# Patient Record
Sex: FEMALE | Race: BLACK OR AFRICAN AMERICAN | Employment: UNEMPLOYED | URBAN - METROPOLITAN AREA
[De-identification: names, ages, dates, MRNs, and addresses within clinical notes are randomized per-mention and may not be internally consistent; named-entity substitution may affect disease eponyms.]

---

## 2017-01-20 ENCOUNTER — HOSPITAL ENCOUNTER (OUTPATIENT)
Dept: GENERAL RADIOLOGY | Age: 62
Discharge: HOME | End: 2017-01-20
Attending: PHYSICIAN ASSISTANT | Admitting: PHYSICIAN ASSISTANT

## 2017-01-20 LAB
ALBUMIN SERPL-MCNC: 4.3 GM/DL (ref 3.4–5)
ALP BLD-CCNC: 87 IU/L (ref 40–129)
ALT SERPL-CCNC: 12 U/L (ref 10–40)
AST SERPL-CCNC: 20 IU/L (ref 15–37)
BASOPHILS ABSOLUTE: 0 K/CU MM
BASOPHILS RELATIVE PERCENT: 0.5 % (ref 0–1)
BILIRUB SERPL-MCNC: 0.3 MG/DL (ref 0–1)
BILIRUBIN DIRECT: 0.2 MG/DL (ref 0–0.3)
BILIRUBIN, INDIRECT: 0.1 MG/DL (ref 0–0.7)
BUN BLDV-MCNC: 14 MG/DL (ref 6–23)
CREAT SERPL-MCNC: 0.9 MG/DL (ref 0.6–1.1)
DIFFERENTIAL TYPE: ABNORMAL
EOSINOPHILS ABSOLUTE: 0.1 K/CU MM
EOSINOPHILS RELATIVE PERCENT: 1.4 % (ref 0–3)
GFR AFRICAN AMERICAN: >60 ML/MIN/1.73M2
GFR NON-AFRICAN AMERICAN: >60 ML/MIN/1.73M2
HCT VFR BLD CALC: 48 % (ref 37–47)
HEMOGLOBIN: 15.2 GM/DL (ref 12.5–16)
HEPATITIS B SURFACE ANTIGEN: NON REACTIVE
HEPATITIS C ANTIBODY: NON REACTIVE
IMMATURE NEUTROPHIL %: 0.1 % (ref 0–0.43)
LYMPHOCYTES ABSOLUTE: 2.6 K/CU MM
LYMPHOCYTES RELATIVE PERCENT: 33.3 % (ref 24–44)
MCH RBC QN AUTO: 28.7 PG (ref 27–31)
MCHC RBC AUTO-ENTMCNC: 31.7 % (ref 32–36)
MCV RBC AUTO: 90.6 FL (ref 78–100)
MONOCYTES ABSOLUTE: 0.7 K/CU MM
MONOCYTES RELATIVE PERCENT: 8.7 % (ref 0–4)
NUCLEATED RBC %: 0 %
PDW BLD-RTO: 14.7 % (ref 11.7–14.9)
PLATELET # BLD: 371 K/CU MM (ref 140–440)
PMV BLD AUTO: 10.5 FL (ref 7.5–11.1)
RBC # BLD: 5.3 M/CU MM (ref 4.2–5.4)
SEGMENTED NEUTROPHILS ABSOLUTE COUNT: 4.3 K/CU MM
SEGMENTED NEUTROPHILS RELATIVE PERCENT: 56 % (ref 36–66)
TOTAL IMMATURE NEUTOROPHIL: 0.01 K/CU MM
TOTAL NUCLEATED RBC: 0 K/CU MM
TOTAL PROTEIN: 6.7 GM/DL (ref 6.4–8.2)
WBC # BLD: 7.7 K/CU MM (ref 4–10.5)

## 2017-01-22 LAB — HEPATITIS B CORE TOTAL ANTIBODY: NEGATIVE

## 2019-06-04 ENCOUNTER — ANESTHESIA EVENT (OUTPATIENT)
Dept: OPERATING ROOM | Age: 64
End: 2019-06-04
Payer: MEDICARE

## 2019-06-04 ASSESSMENT — LIFESTYLE VARIABLES: SMOKING_STATUS: 1

## 2019-06-04 NOTE — ANESTHESIA PRE PROCEDURE
Department of Anesthesiology  Preprocedure Note       Name:  Adenike Adam   Age:  70 y.o.  :  1955                                          MRN:  [id number]         Date:  2019      Surgeon: Chandrakant Yan):  Lily Subramanian MD    Procedure: COLORECTAL CANCER SCREENING, NOT HIGH RISK (N/A )    Medications prior to admission:   Prior to Admission medications    Medication Sig Start Date End Date Taking? Authorizing Provider   butalbital-APAP-caffeine (FIORICET) -40 MG CAPS per capsule Take 1 capsule by mouth every 6 hours as needed for Headaches 18   Anastacio Beebe MD   clopidogrel (PLAVIX) 75 MG tablet Take 1 tablet by mouth daily. 12   Yash Donovan MD   hydrochlorothiazide (HYDRODIURIL) 12.5 MG tablet Take 1 tablet by mouth daily. 12   Yash Donovan MD       Current medications:    No current facility-administered medications for this encounter. Current Outpatient Medications   Medication Sig Dispense Refill    butalbital-APAP-caffeine (FIORICET) -40 MG CAPS per capsule Take 1 capsule by mouth every 6 hours as needed for Headaches 84 capsule 0    clopidogrel (PLAVIX) 75 MG tablet Take 1 tablet by mouth daily. 30 tablet 0    hydrochlorothiazide (HYDRODIURIL) 12.5 MG tablet Take 1 tablet by mouth daily. 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 Social History:    Social History     Tobacco Use    Smoking status: Current Every Day Smoker     Packs/day: 0.25     Types: Cigarettes    Smokeless tobacco: Never Used   Substance Use Topics    Alcohol use:  No                                Ready to quit: Not Answered  Counseling given: Not Answered      Vital Signs (Current): There were no vitals filed for this visit. BP Readings from Last 3 Encounters:   05/27/18 (!) 126/111       NPO Status:                                                                                 BMI:   Wt Readings from Last 3 Encounters:   05/27/18 145 lb (65.8 kg)   09/15/16 145 lb (65.8 kg)     There is no height or weight on file to calculate BMI.    CBC:   Lab Results   Component Value Date    WBC 9.5 05/27/2018    RBC 4.86 05/27/2018    HGB 14.0 05/27/2018    HCT 42.7 05/27/2018    MCV 87.9 05/27/2018    RDW 13.5 05/27/2018     05/27/2018       CMP:   Lab Results   Component Value Date     05/27/2018    K 3.7 05/27/2018     05/27/2018    CO2 25 05/27/2018    BUN 11 05/27/2018    CREATININE 0.8 05/27/2018    GFRAA >60 05/27/2018    LABGLOM >60 05/27/2018    GLUCOSE 117 05/27/2018    PROT 6.2 05/27/2018    PROT 6.4 08/04/2012    CALCIUM 9.3 05/27/2018    BILITOT 0.3 05/27/2018    ALKPHOS 86 05/27/2018    AST 13 05/27/2018    ALT 8 05/27/2018       POC Tests: No results for input(s): POCGLU, POCNA, POCK, POCCL, POCBUN, POCHEMO, POCHCT in the last 72 hours. Coags:   Lab Results   Component Value Date    PROTIME 11.2 05/27/2018    INR 0.97 05/27/2018    APTT 31.7 01/08/2014       HCG (If Applicable): No results found for: PREGTESTUR, PREGSERUM, HCG, HCGQUANT     ABGs: No results found for: PHART, PO2ART, IBK3MIO, ZOT7YHI, BEART, N9SOKALM     Type & Screen (If Applicable):  No results found for: LABABO, [address]    Anesthesia Evaluation  Patient summary reviewed and Nursing notes reviewed no history of anesthetic complications:   Airway: Mallampati: IV  TM distance: <3 FB   Neck ROM: full  Mouth opening: < 3 FB Dental:          Pulmonary:normal exam    (+) current smoker          Patient smoked on day of surgery.                  Cardiovascular:  Exercise tolerance: good (>4 METS),   (+) hypertension:,

## 2019-06-05 ENCOUNTER — ANESTHESIA (OUTPATIENT)
Dept: OPERATING ROOM | Age: 64
End: 2019-06-05
Payer: MEDICARE

## 2019-06-05 ENCOUNTER — HOSPITAL ENCOUNTER (OUTPATIENT)
Age: 64
Setting detail: OUTPATIENT SURGERY
Discharge: HOME | End: 2019-06-05
Attending: INTERNAL MEDICINE | Admitting: INTERNAL MEDICINE
Payer: MEDICARE

## 2019-06-05 VITALS
DIASTOLIC BLOOD PRESSURE: 88 MMHG | HEIGHT: 69 IN | WEIGHT: 136 LBS | BODY MASS INDEX: 20.14 KG/M2 | SYSTOLIC BLOOD PRESSURE: 133 MMHG | TEMPERATURE: 97 F | OXYGEN SATURATION: 100 % | HEART RATE: 68 BPM | RESPIRATION RATE: 16 BRPM

## 2019-06-05 VITALS
SYSTOLIC BLOOD PRESSURE: 118 MMHG | RESPIRATION RATE: 12 BRPM | TEMPERATURE: 98.6 F | OXYGEN SATURATION: 100 % | DIASTOLIC BLOOD PRESSURE: 61 MMHG

## 2019-06-05 PROCEDURE — 2580000003 HC RX 258: Performed by: INTERNAL MEDICINE

## 2019-06-05 PROCEDURE — 2709999900 HC NON-CHARGEABLE SUPPLY: Performed by: INTERNAL MEDICINE

## 2019-06-05 PROCEDURE — 7100000011 HC PHASE II RECOVERY - ADDTL 15 MIN: Performed by: INTERNAL MEDICINE

## 2019-06-05 PROCEDURE — 2500000003 HC RX 250 WO HCPCS: Performed by: NURSE ANESTHETIST, CERTIFIED REGISTERED

## 2019-06-05 PROCEDURE — 3700000001 HC ADD 15 MINUTES (ANESTHESIA): Performed by: INTERNAL MEDICINE

## 2019-06-05 PROCEDURE — 3700000000 HC ANESTHESIA ATTENDED CARE: Performed by: INTERNAL MEDICINE

## 2019-06-05 PROCEDURE — 7100000010 HC PHASE II RECOVERY - FIRST 15 MIN: Performed by: INTERNAL MEDICINE

## 2019-06-05 PROCEDURE — 6360000002 HC RX W HCPCS: Performed by: NURSE ANESTHETIST, CERTIFIED REGISTERED

## 2019-06-05 PROCEDURE — 3609010600 HC COLONOSCOPY POLYPECTOMY SNARE/COLD BIOPSY: Performed by: INTERNAL MEDICINE

## 2019-06-05 PROCEDURE — 88305 TISSUE EXAM BY PATHOLOGIST: CPT

## 2019-06-05 RX ORDER — SODIUM CHLORIDE, SODIUM LACTATE, POTASSIUM CHLORIDE, CALCIUM CHLORIDE 600; 310; 30; 20 MG/100ML; MG/100ML; MG/100ML; MG/100ML
INJECTION, SOLUTION INTRAVENOUS CONTINUOUS
Status: DISCONTINUED | OUTPATIENT
Start: 2019-06-05 | End: 2019-06-05 | Stop reason: HOSPADM

## 2019-06-05 RX ORDER — LIDOCAINE HYDROCHLORIDE 20 MG/ML
INJECTION, SOLUTION EPIDURAL; INFILTRATION; INTRACAUDAL; PERINEURAL PRN
Status: DISCONTINUED | OUTPATIENT
Start: 2019-06-05 | End: 2019-06-05 | Stop reason: SDUPTHER

## 2019-06-05 RX ORDER — PROPOFOL 10 MG/ML
INJECTION, EMULSION INTRAVENOUS PRN
Status: DISCONTINUED | OUTPATIENT
Start: 2019-06-05 | End: 2019-06-05 | Stop reason: SDUPTHER

## 2019-06-05 RX ORDER — ASCORBIC ACID 500 MG
500 TABLET ORAL EVERY MORNING
COMMUNITY

## 2019-06-05 RX ADMIN — PROPOFOL 140 MG: 10 INJECTION, EMULSION INTRAVENOUS at 09:28

## 2019-06-05 RX ADMIN — PROPOFOL 60 MG: 10 INJECTION, EMULSION INTRAVENOUS at 09:26

## 2019-06-05 RX ADMIN — SODIUM CHLORIDE, POTASSIUM CHLORIDE, SODIUM LACTATE AND CALCIUM CHLORIDE: 600; 310; 30; 20 INJECTION, SOLUTION INTRAVENOUS at 08:24

## 2019-06-05 RX ADMIN — LIDOCAINE HYDROCHLORIDE 100 MG: 20 INJECTION, SOLUTION EPIDURAL; INFILTRATION; INTRACAUDAL; PERINEURAL at 09:26

## 2019-06-05 ASSESSMENT — PAIN SCALES - GENERAL
PAINLEVEL_OUTOF10: 0
PAINLEVEL_OUTOF10: 0

## 2019-06-05 ASSESSMENT — PAIN - FUNCTIONAL ASSESSMENT: PAIN_FUNCTIONAL_ASSESSMENT: 0-10

## 2019-06-05 NOTE — PROGRESS NOTES
0957-. To Pacu, awake, denies any pain, nausea or SOB, updated on plan of care, call light in reach.   1000-Repositioned to semi-fowlers, PO fluids given  1002-Dr. Gamble in to update on procedure, family @ bedside  1018-Tolerating fluids well, instructions re-reviewed with pt as given preoperatively, understanding verbalized  1030-Ride called to  patient  1046-To car per w/c with written instructions,  present

## 2019-06-05 NOTE — ANESTHESIA POSTPROCEDURE EVALUATION
Department of Anesthesiology  Postprocedure Note    Patient: Pawel Reyes  MRN: [id number]  YOB: 1955  Date of evaluation: 6/5/2019  Time:  10:41 AM     Procedure Summary     Date:  06/05/19 Room / Location:  [address] [facility] OR 04 / [address] [facility] OR    Anesthesia Start:  3192 Anesthesia Stop:  1851    Procedure:  COLONOSCOPY POLYPECTOMY SNARE/COLD BIOPSY (N/A ) Diagnosis:  (Screening)    Surgeon:  Rodney Morin MD Responsible Provider:  MARY Henriquez CRNA    Anesthesia Type:  MAC ASA Status:  2          Anesthesia Type: MAC    Kennedy Phase I:      Kennedy Phase II: Kennedy Score: 10    Last vitals: Reviewed and per EMR flowsheets.        Anesthesia Post Evaluation    Patient location during evaluation: PACU  Patient participation: complete - patient participated  Level of consciousness: awake and alert  Pain score: 0  Airway patency: patent  Nausea & Vomiting: no nausea and no vomiting  Complications: no  Cardiovascular status: hemodynamically stable  Respiratory status: room air and spontaneous ventilation  Hydration status: stable

## 2021-04-19 ENCOUNTER — APPOINTMENT (OUTPATIENT)
Dept: CT IMAGING | Age: 66
End: 2021-04-19
Payer: MEDICARE

## 2021-04-19 ENCOUNTER — APPOINTMENT (OUTPATIENT)
Dept: GENERAL RADIOLOGY | Age: 66
End: 2021-04-19
Payer: MEDICARE

## 2021-04-19 ENCOUNTER — HOSPITAL ENCOUNTER (EMERGENCY)
Age: 66
Discharge: ANOTHER HOSPITAL | End: 2021-04-19
Attending: EMERGENCY MEDICINE
Payer: MEDICARE

## 2021-04-19 VITALS
TEMPERATURE: 97.6 F | BODY MASS INDEX: 20.73 KG/M2 | WEIGHT: 140 LBS | SYSTOLIC BLOOD PRESSURE: 103 MMHG | OXYGEN SATURATION: 98 % | HEIGHT: 69 IN | HEART RATE: 109 BPM | RESPIRATION RATE: 23 BRPM | DIASTOLIC BLOOD PRESSURE: 77 MMHG

## 2021-04-19 DIAGNOSIS — D68.9 COAGULOPATHY (HCC): ICD-10-CM

## 2021-04-19 DIAGNOSIS — S09.8XXA BLUNT HEAD TRAUMA, INITIAL ENCOUNTER: ICD-10-CM

## 2021-04-19 DIAGNOSIS — I67.1 BRAIN ANEURYSM: ICD-10-CM

## 2021-04-19 DIAGNOSIS — I50.23 ACUTE ON CHRONIC SYSTOLIC CONGESTIVE HEART FAILURE (HCC): ICD-10-CM
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
## 2021-04-19 DIAGNOSIS — Q28.2 AVM (ARTERIOVENOUS MALFORMATION) BRAIN: ICD-10-CM

## 2021-04-19 DIAGNOSIS — E04.1 RIGHT THYROID NODULE: ICD-10-CM

## 2021-04-19 DIAGNOSIS — I21.4 NSTEMI (NON-ST ELEVATED MYOCARDIAL INFARCTION) (HCC): ICD-10-CM

## 2021-04-19 DIAGNOSIS — T79.6XXA TRAUMATIC RHABDOMYOLYSIS, INITIAL ENCOUNTER (HCC): ICD-10-CM

## 2021-04-19 DIAGNOSIS — R09.89 DEPRESSED LEFT VENTRICULAR EJECTION FRACTION: ICD-10-CM

## 2021-04-19 DIAGNOSIS — R74.01 TRANSAMINITIS: ICD-10-CM

## 2021-04-19 DIAGNOSIS — J90 BILATERAL PLEURAL EFFUSION: ICD-10-CM

## 2021-04-19 LAB
ALBUMIN SERPL-MCNC: 3.5 GM/DL (ref 3.4–5)
ALP BLD-CCNC: 99 IU/L (ref 40–129)
ALT SERPL-CCNC: 37 U/L (ref 10–40)
ANION GAP SERPL CALCULATED.3IONS-SCNC: 11 MMOL/L (ref 4–16)
AST SERPL-CCNC: 81 IU/L (ref 15–37)
BASOPHILS ABSOLUTE: 0 K/CU MM
BASOPHILS RELATIVE PERCENT: 0.3 % (ref 0–1)
BILIRUB SERPL-MCNC: 0.9 MG/DL (ref 0–1)
BUN BLDV-MCNC: 33 MG/DL (ref 6–23)
CALCIUM SERPL-MCNC: 10.4 MG/DL (ref 8.3–10.6)
CHLORIDE BLD-SCNC: 98 MMOL/L (ref 99–110)
CO2: 28 MMOL/L (ref 21–32)
CREAT SERPL-MCNC: 1.1 MG/DL (ref 0.6–1.1)
DIFFERENTIAL TYPE: ABNORMAL
EOSINOPHILS ABSOLUTE: 0 K/CU MM
EOSINOPHILS RELATIVE PERCENT: 0 % (ref 0–3)
GFR AFRICAN AMERICAN: 60 ML/MIN/1.73M2
GFR NON-AFRICAN AMERICAN: 50 ML/MIN/1.73M2
GLUCOSE BLD-MCNC: 111 MG/DL (ref 70–99)
GLUCOSE BLD-MCNC: 155 MG/DL (ref 70–99)
HCT VFR BLD CALC: 58.1 % (ref 37–47)
HEMOGLOBIN: 19 GM/DL (ref 12.5–16)
IMMATURE NEUTROPHIL %: 0.4 % (ref 0–0.43)
LV EF: 10 %
LVEF MODALITY: NORMAL
LYMPHOCYTES ABSOLUTE: 1.1 K/CU MM
LYMPHOCYTES RELATIVE PERCENT: 8 % (ref 24–44)
MAGNESIUM: 2.6 MG/DL (ref 1.8–2.4)
MCH RBC QN AUTO: 28.8 PG (ref 27–31)
MCHC RBC AUTO-ENTMCNC: 32.7 % (ref 32–36)
MCV RBC AUTO: 88.2 FL (ref 78–100)
MONOCYTES ABSOLUTE: 0.8 K/CU MM
MONOCYTES RELATIVE PERCENT: 5.6 % (ref 0–4)
NUCLEATED RBC %: 0 %
PDW BLD-RTO: 15.2 % (ref 11.7–14.9)
PLATELET # BLD: 232 K/CU MM (ref 140–440)
PMV BLD AUTO: 10.6 FL (ref 7.5–11.1)
POTASSIUM SERPL-SCNC: 4.2 MMOL/L (ref 3.5–5.1)
PRO-BNP: ABNORMAL PG/ML
RBC # BLD: 6.59 M/CU MM (ref 4.2–5.4)
SARS-COV-2, NAAT: NOT DETECTED
SEGMENTED NEUTROPHILS ABSOLUTE COUNT: 12.2 K/CU MM
SEGMENTED NEUTROPHILS RELATIVE PERCENT: 85.7 % (ref 36–66)
SODIUM BLD-SCNC: 137 MMOL/L (ref 135–145)
TOTAL CK: 1108 IU/L (ref 26–140)
TOTAL IMMATURE NEUTOROPHIL: 0.05 K/CU MM
TOTAL NUCLEATED RBC: 0 K/CU MM
TOTAL PROTEIN: 8 GM/DL (ref 6.4–8.2)
TROPONIN T: 3.57 NG/ML
WBC # BLD: 14.2 K/CU MM (ref 4–10.5)

## 2021-04-19 PROCEDURE — 6360000004 HC RX CONTRAST MEDICATION: Performed by: INTERNAL MEDICINE

## 2021-04-19 PROCEDURE — 83605 ASSAY OF LACTIC ACID: CPT

## 2021-04-19 PROCEDURE — 83880 ASSAY OF NATRIURETIC PEPTIDE: CPT

## 2021-04-19 PROCEDURE — 70450 CT HEAD/BRAIN W/O DYE: CPT

## 2021-04-19 PROCEDURE — 71045 X-RAY EXAM CHEST 1 VIEW: CPT

## 2021-04-19 PROCEDURE — 82550 ASSAY OF CK (CPK): CPT

## 2021-04-19 PROCEDURE — 83735 ASSAY OF MAGNESIUM: CPT

## 2021-04-19 PROCEDURE — 2580000003 HC RX 258: Performed by: PHYSICIAN ASSISTANT

## 2021-04-19 PROCEDURE — 70496 CT ANGIOGRAPHY HEAD: CPT

## 2021-04-19 PROCEDURE — 84484 ASSAY OF TROPONIN QUANT: CPT

## 2021-04-19 PROCEDURE — 72170 X-RAY EXAM OF PELVIS: CPT

## 2021-04-19 PROCEDURE — 87635 SARS-COV-2 COVID-19 AMP PRB: CPT

## 2021-04-19 PROCEDURE — 82962 GLUCOSE BLOOD TEST: CPT

## 2021-04-19 PROCEDURE — 71275 CT ANGIOGRAPHY CHEST: CPT

## 2021-04-19 PROCEDURE — 93005 ELECTROCARDIOGRAM TRACING: CPT | Performed by: PHYSICIAN ASSISTANT

## 2021-04-19 PROCEDURE — 85025 COMPLETE CBC W/AUTO DIFF WBC: CPT

## 2021-04-19 PROCEDURE — 80053 COMPREHEN METABOLIC PANEL: CPT

## 2021-04-19 PROCEDURE — 99204 OFFICE O/P NEW MOD 45 MIN: CPT | Performed by: INTERNAL MEDICINE

## 2021-04-19 PROCEDURE — 72125 CT NECK SPINE W/O DYE: CPT

## 2021-04-19 PROCEDURE — 93306 TTE W/DOPPLER COMPLETE: CPT

## 2021-04-19 PROCEDURE — 99285 EMERGENCY DEPT VISIT HI MDM: CPT

## 2021-04-19 RX ORDER — 0.9 % SODIUM CHLORIDE 0.9 %
1000 INTRAVENOUS SOLUTION INTRAVENOUS ONCE
Status: COMPLETED | OUTPATIENT
Start: 2021-04-19 | End: 2021-04-19

## 2021-04-19 RX ADMIN — IOPAMIDOL 120 ML: 755 INJECTION, SOLUTION INTRAVENOUS at 15:43

## 2021-04-19 RX ADMIN — SODIUM CHLORIDE 1000 ML: 9 INJECTION, SOLUTION INTRAVENOUS at 15:00

## 2021-04-19 NOTE — ED PROVIDER NOTES
EMERGENCY DEPARTMENT [facility] EMERGENCY DEPARTMENT        TRIAGE CHIEF COMPLAINT:   Fall (On the floor for three days)      [place]:  Jett Edwards is a 77 y.o. female that presents via EMS from home for fall, found down on ground for 3 days and generalized weakness. Per EMS, patient was found by son this morning, reportedly had fallen on Saturday has been on on the ground since then. Son told EMS that patient Edwin Mara all over. \"  Patient states that on Friday she believes she had food poisoning was having vomiting and diarrhea. On Saturday she \"fell but I do not know why. \"  She cannot recall any preceding headache dizziness lightheadedness chest pain or shortness of breath. She does not believe that she struck her head when she fell or passed out but does state she has been laying on her stomach for the last 3 days until son found her this morning. She states she feels generally weak all over but no localized pain. She does smell strongly of urine. Does state that she takes Plavix. No vision changes, vision loss, has pain shortness of breath, hemoptysis, fever or chills. States that her lower legs feel weak but is denying any pelvic or localized joint pain. No numbness or tingling in the arms or the legs. Denies any abdominal pain dysuria hematuria, lower back pain neck pain or stiffness. No reported blood or clear fluid from the ears nose or mouth. Patient son later presents emergency department, states that he saw the patient on Saturday and she was having nausea vomiting diarrhea after eating [employer] the day before. Brought her chicken noodle soup and left to [place] and did find her on the floor laying on the left side today. She was awake and talkative and acting at baseline mental status however when he tried to stand her off the ground, she was not able to hold her weight on her legs which prompted him to call EMS.        ROS:  General: No fevers, no chills   Cardiovascular:  No chest pain, no palpitations  Respiratory:  No shortness of breath, no cough, no wheezing  Gastrointestinal:  See HPI  Musculoskeletal:  No muscle pain, no joint pain  Skin:  No rash, no pruritis, no easy bruising  Neurologic:  No speech problems, no headache. See HPI  Psychiatric:  No anxiety  Genitourinary:  No dysuria, no hematuria  Extremities:  No edema    Past Medical History:   Diagnosis Date    Arthritis     bilat knees    Cerebral artery occlusion with cerebral infarction [facility]) 2004    Left sided weakness    Hip discomfort left    chronic per pt    Stuttering      Past Surgical History:   Procedure Laterality Date    COLONOSCOPY  2005    Normal exam per pt    COLONOSCOPY  06/05/2019    Polyp x1    COLONOSCOPY N/A 6/5/2019    COLONOSCOPY POLYPECTOMY SNARE/COLD BIOPSY performed by Clint Lefort, MD at [place] [address]     History reviewed. No pertinent family history.   Social History     Socioeconomic History    Marital status: Single     Spouse name: Not on file    Number of children: Not on file    Years of education: Not on file    Highest education level: Not on file   Occupational History    Not on file   Social Needs    Financial resource strain: Not on file    Food insecurity     Worry: Not on file     Inability: Not on file    Transportation needs     Medical: Not on file     Non-medical: Not on file   Tobacco Use    Smoking status: Current Every Day Smoker     Packs/day: 0.25     Types: Cigarettes    Smokeless tobacco: Never Used   Substance and Sexual Activity    Alcohol use: No    Drug use: No    Sexual activity: Never   Lifestyle    Physical activity     Days per week: Not on file     Minutes per session: Not on file    Stress: Not on file   Relationships    Social connections     Talks on phone: Not on file     Gets together: Not on file     Attends [employer] service: Not on file     Active member of club or organization: Not on file Attends meetings of clubs or organizations: Not on file     Relationship status: Not on file    Intimate partner violence     Fear of current or ex partner: Not on file     Emotionally abused: Not on file     Physically abused: Not on file     Forced sexual activity: Not on file   Other Topics Concern    Not on file   Social History Narrative    Not on file     Current Facility-Administered Medications   Medication Dose Route Frequency Provider Last Rate Last Admin    iopamidol (ISOVUE-370) 76 % injection 75 mL  75 mL Intravenous ONCE PRN Varun Lamar MD         Current Outpatient Medications   Medication Sig Dispense Refill    Cholecalciferol (VITAMIN D3) 5000 units TABS Take by mouth every morning      vitamin C (ASCORBIC ACID) 500 MG tablet Take 500 mg by mouth every morning      clopidogrel (PLAVIX) 75 MG tablet Take 1 tablet by mouth daily. 30 tablet 0    hydrochlorothiazide (HYDRODIURIL) 12.5 MG tablet Take 1 tablet by mouth daily. 30 tablet 0     No Known Allergies    Nursing Notes Reviewed  PHYSICAL EXAM    VITAL SIGNS: /77   Pulse 109   Temp 97.6 °F (36.4 °C) (Oral)   Resp 23   Ht 5' 9\" (1.753 m)   Wt 140 lb (63.5 kg)   SpO2 98%   BMI 20.67 kg/m²    Constitutional:  Well developed, thin female, generally fatigued and weak appearing, smells strongly of urine. Head:  Normocephalic, Atraumatic. No evidence of traumatic injury, step-offs or depressions to the bony skull or facial bones. Tacky mucous membranes  EYes: PERRL. EOMI. No nystagmus. Sclera clear. Conjunctiva normal, No discharge. Neck/Lymphatics: Supple, no JVD, trachea is midline. No midline C-spine tenderness crepitus or step-offs. Spontaneously moving neck in all directions. Cardiovascular: Tachycardic rate 110 bpm, regular rhythm. Normal S1/S2. No chest wall bruising discoloration or tenderness to palpation. Sternum is stable and nontender. Equal symmetrical chest wall rise.   Peripheral Vascular: Distal pulses 2+, Capillary refill <2seconds  Respiratory:  Respirations nonnlabored, diminished but equal air exchange bilaterally. No rales or inspiratory stridor. No retractions or accessory muscle use. Clear to auscultation bilaterally, No retractions  Abdomen: Bowel sounds normal in all quadrants, Soft, Non tender/Nondistended, No palpable abdominal masses. Musculoskeletal: BUE/BLE symmetrical without atrophy or deformities. Pelvis is stable and nontender. No localized extremity or joint tenderness. Moving upper extremities with equal strength and coordination. Patient unable to actively lift legs off of bed bilaterally however when passively placed into hip flexion position, she is able to flex and extend hips and knees. 3/5 dorsi/plantar flexion against resistance. Integument:  Warm, Dry, Intact. No evidence of traumatic skin injuries, ecchymosis or hematoma to the trunk or extremities  Neurologic:  Alert & oriented x3 , No focal deficits noted. Cranial nerves II through XII grossly intact. No slurred speech. No facial droop. Finger to nose intact, difficult to perform heel-to-shin testing secondary to lower leg weakness. Otherwise upper extremity with normal gross motor coordination and strength. Sensation equal intact light sharp touch about the bilateral upper and lower extremities. No pronator drift. No truncal ataxia in the bed but patient does appear globally weak and deconditioned.      Psychiatric:  Affect appropriate      I have reviewed and interpreted all of the currently available lab results from this visit (if applicable):  Results for orders placed or performed during the hospital encounter of 04/19/21   COVID-19, Rapid    Specimen: Nasopharyngeal   Result Value Ref Range    SARS-CoV-2, NAAT NOT DETECTED NOT DETECTED   CBC auto diff   Result Value Ref Range    WBC 14.2 (H) 4.0 - 10.5 K/CU MM    RBC 6.59 (H) 4.2 - 5.4 M/CU MM    Hemoglobin 19.0 (H) 12.5 - 16.0 GM/DL    Hematocrit 58.1 (H) 37 - 47 %    MCV 88.2 78 - 100 FL    MCH 28.8 27 - 31 PG    MCHC 32.7 32.0 - 36.0 %    RDW 15.2 (H) 11.7 - 14.9 %    Platelets 080 956 - 123 K/CU MM    MPV 10.6 7.5 - 11.1 FL    Differential Type AUTOMATED DIFFERENTIAL     Segs Relative 85.7 (H) 36 - 66 %    Lymphocytes % 8.0 (L) 24 - 44 %    Monocytes % 5.6 (H) 0 - 4 %    Eosinophils % 0.0 0 - 3 %    Basophils % 0.3 0 - 1 %    Segs Absolute 12.2 K/CU MM    Lymphocytes Absolute 1.1 K/CU MM    Monocytes Absolute 0.8 K/CU MM    Eosinophils Absolute 0.0 K/CU MM    Basophils Absolute 0.0 K/CU MM    Nucleated RBC % 0.0 %    Total Nucleated RBC 0.0 K/CU MM    Total Immature Neutrophil 0.05 K/CU MM    Immature Neutrophil % 0.4 0 - 0.43 %   CMP   Result Value Ref Range    Sodium 137 135 - 145 MMOL/L    Potassium 4.2 3.5 - 5.1 MMOL/L    Chloride 98 (L) 99 - 110 mMol/L    CO2 28 21 - 32 MMOL/L    BUN 33 (H) 6 - 23 MG/DL    CREATININE 1.1 0.6 - 1.1 MG/DL    Glucose 155 (H) 70 - 99 MG/DL    Calcium 10.4 8.3 - 10.6 MG/DL    Albumin 3.5 3.4 - 5.0 GM/DL    Total Protein 8.0 6.4 - 8.2 GM/DL    Total Bilirubin 0.9 0.0 - 1.0 MG/DL    ALT 37 10 - 40 U/L    AST 81 (H) 15 - 37 IU/L    Alkaline Phosphatase 99 40 - 129 IU/L    GFR Non- 50 (L) >60 mL/min/1.73m2    GFR  60 (L) >60 mL/min/1.73m2    Anion Gap 11 4 - 16   Troponin   Result Value Ref Range    Troponin T 3.570 (HH) <0.01 NG/ML   CK   Result Value Ref Range    Total CK 1,108 (H) 26 - 140 IU/L   Magnesium   Result Value Ref Range    Magnesium 2.6 (H) 1.8 - 2.4 mg/dl   Brain Natriuretic Peptide   Result Value Ref Range    Pro-BNP 32,193 (H) <300 PG/ML   POCT Glucose   Result Value Ref Range    POC Glucose 111 (H) 70 - 99 MG/DL   EKG 12 Lead   Result Value Ref Range    Ventricular Rate 111 BPM    Atrial Rate 111 BPM    P-R Interval 144 ms    QRS Duration 134 ms    Q-T Interval 352 ms    QTc Calculation (Bazett) 478 ms    P Axis 78 degrees    R Axis 99 degrees    T Axis -40 degrees    Diagnosis Sinus tachycardia  Possible Left atrial enlargement  Right bundle branch block  Possible Inferior infarct , age undetermined  Anterior infarct , age undetermined  T wave abnormality, consider lateral ischemia  Abnormal ECG  No previous ECGs available          Radiographs (if obtained):  [] The following radiograph was interpreted by myself in the absence of a radiologist:   [] Radiologist's Report Reviewed:  CTA HEAD NECK W CONTRAST   Preliminary Result   1. Left frontal arteriovenous malformation accounting for the hyperdensity   seen on recent CT. 2. No definite evidence of subarachnoid hemorrhage. 3. 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 4. Motion degraded CTA without large vessel occlusion in the head or neck. CTA CHEST W CONTRAST   Final Result   1. No pulmonary embolism. 2. Nonspecific small volume bilateral pleural effusion with minimal   associated relaxation atelectasis, greater on the left than right. 3. Coronary artery disease. 4. Suspect distal esophagitis. Correlate with clinical findings. Consider   additional evaluation with endoscopy. XR CHEST PORTABLE   Final Result   Mild vascular congestion bilaterally and small left pleural effusion. XR PELVIS (1-2 VIEWS)   Final Result   No convincing radiographic evidence of acute osseous abnormality pelvis is   seen. RECOMMENDATION:   If there is persistent clinical concern, such as occult hip fracture, MRI is   recommended for further evaluation. CT CERVICAL SPINE WO CONTRAST   Final Result   No acute fracture of the cervical spine evident. Indeterminate 3.1 cm nodule within the right lobe of the thyroid gland for   which a nonemergent follow-up thyroid ultrasound is recommended, per the   recommendations below.          CT HEAD WO CONTRAST   Final Result   Serpiginous high density along the medial aspect the left frontal lobe which   could represent a normal vessel but was not evident on the prior CT scan and   therefore a small volume subarachnoid hemorrhage along the medial aspect of   the inferior left frontal lobe could also have this appearance. Consider CT   angiography of the brain to evaluate for a possible cerebral aneurysm. A   follow-up CT of the brain in 12-24 hours is recommended to ensure   stability/resolution. The above findings and recommendations were discussed with Ramandeep Carpenter   at noon on 04/19/2021. CTA HEAD NECK W CONTRAST (Preliminary result)  Result time 04/19/21 16:19:27   Preliminary result by Naif Paez MD (04/19/21 16:19:27)         Impression:    1. Left frontal arteriovenous malformation accounting for the hyperdensity   seen on recent CT. 2. No definite evidence of subarachnoid hemorrhage. 3. 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 4. Motion degraded CTA without large vessel occlusion in the head or neck. Narrative:    EXAMINATION:   CTA OF THE HEAD AND NECK WITH CONTRAST 4/19/2021 3:17 pm:     TECHNIQUE:   CTA of the head and neck was performed with the administration of intravenous   contrast. Multiplanar reformatted images are provided for review. MIP images   are provided for review. Stenosis of the internal carotid arteries measured   using NASCET criteria. Dose modulation, iterative reconstruction, and/or   weight based adjustment of the mA/kV was utilized to reduce the radiation   dose to as low as reasonably achievable. COMPARISON:   None. HISTORY:   ORDERING SYSTEM PROVIDED HISTORY: found down for 3 days, abnormal head CT,   r/o aneurysm   TECHNOLOGIST PROVIDED HISTORY:   Reason for exam:->found down for 3 days, abnormal head CT, r/o aneurysm   Decision Support Exception->Emergency Medical Condition (MA)   Reason for Exam: DIZZINESS, FOPUND DOWN FOR 3 DAY     FINDINGS:     CTA NECK:     The examination is motion degraded. AORTIC ARCH/ARCH VESSELS: No dissection or arterial injury.  No significant   stenosis of the brachiocephalic or subclavian arteries. CAROTID ARTERIES: No dissection, arterial injury, or hemodynamically   significant stenosis by NASCET criteria. VERTEBRAL ARTERIES: No dissection, arterial injury, or significant stenosis. SOFT TISSUES: The lung apices are clear. No cervical or superior mediastinal   lymphadenopathy. The larynx and pharynx are unremarkable. No acute   abnormality of the salivary and thyroid glands. BONES: No acute osseous abnormality. CTA HEAD:     The examination is motion degraded. ANTERIOR CIRCULATION: No significant stenosis of the intracranial internal   carotid, anterior cerebral, or middle cerebral arteries. POSTERIOR CIRCULATION: No significant stenosis of the vertebral, basilar, or   posterior cerebral arteries. No aneurysm. OTHER: There is a arteriovenous malformation centered in the medial anterior   left frontal lobe with a nidus that measures approximately 20 x 16 x 10 mm. There is a 4 mm intranodule aneurysm. The drainage is superficial to the   superior sagittal sinus. The supply is likely from the anterior cerebral   arteries. BRAIN: No mass effect or midline shift. No extra-axial fluid collection. The   gray-white differentiation is maintained. Preliminary result by Ade Johnston MD (04/19/21 16:13:10)         Impression:    1. Left frontal arteriovenous malformation accounting for the hyperdensity   seen on recent CT. 2. No definite evidence of subarachnoid hemorrhage. 3. 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 4. Motion degraded CTA without large vessel occlusion in the head or neck. CTA CHEST W CONTRAST (Final result)  Result time 04/19/21 15:53:00   Final result by Merline Graver, MD (04/19/21 15:53:00)         Impression:    1. No pulmonary embolism. 2. Nonspecific small volume bilateral pleural effusion with minimal   associated relaxation atelectasis, greater on the left than right.    3. Coronary artery disease. 4. Suspect distal esophagitis. Correlate with clinical findings. Consider   additional evaluation with endoscopy. Narrative:    EXAMINATION:   CTA OF THE CHEST 4/19/2021 3:43 pm     TECHNIQUE:   CTA of the chest was performed after the administration of intravenous   contrast. Multiplanar reformatted images are provided for review. MIP   images are provided for review. Dose modulation, iterative reconstruction,   and/or weight based adjustment of the mA/kV was utilized to reduce the   radiation dose to as low as reasonably achievable. COMPARISON:   CT chest 01/06/2014     HISTORY:   ORDERING SYSTEM PROVIDED HISTORY: abnormal EKG, down for three days     Decision Support Exception->Emergency Medical Condition (MA)   Reason for Exam: abnormal EKG, down for three days     FINDINGS:   Pulmonary Arteries: Pulmonary arteries are adequately opacified for   evaluation. No evidence of intraluminal filling defect to suggest pulmonary   embolism. Main pulmonary artery is normal in caliber, 24 mm. Mediastinum: No evidence of mediastinal lymphadenopathy. Mild, nonspecific,   circumferential distal esophageal wall thickening. Coronary artery   calcifications. The heart and pericardium demonstrate no acute abnormality. There is no acute abnormality of the thoracic aorta. Ascending thoracic   aorta is 29 mm and descending thoracic aorta 24 mm. Stable appearing   goitrous thyroid with exception that cystic areas seen bilaterally are much   smaller. Lungs/pleura: The lungs are without acute process. No focal consolidation or   pulmonary edema. No evidence of pleural effusion or pneumothorax. Upper Abdomen: Limited images of the upper abdomen are unremarkable. Soft Tissues/Bones: No acute bone or soft tissue abnormality.                 XR CHEST PORTABLE (Final result)  Result time 04/19/21 12:33:17   Final result by Emani Gaona MD (04/19/21 12:33:17)         Impression: Mild vascular congestion bilaterally and small left pleural effusion. Narrative:    EXAMINATION:   ONE XRAY VIEW OF THE CHEST     4/19/2021 11:16 am     COMPARISON:   [facility] from 2014     HISTORY:   [address]: fall, generlized weakness   TECHNOLOGIST PROVIDED HISTORY:   Reason for exam:->fall, generlized weakness   Reason for Exam: fell general weakness   Acuity: Acute   Type of Exam: Initial   Mechanism of Injury: fell     FINDINGS:   Mild vascular congestion is noted bilaterally without focal consolidation or   pneumothorax. Possible small left pleural effusion. XR PELVIS (1-2 VIEWS) (Final result)  Result time 04/19/21 12:38:00   Final result by Nehemias Cherry (04/19/21 12:38:00)         Impression:    No convincing radiographic evidence of acute osseous abnormality pelvis is   seen. RECOMMENDATION:   If there is persistent clinical concern, such as occult hip fracture, MRI is   recommended for further evaluation. Narrative:    EXAMINATION:   ONE XRAY VIEW OF THE PELVIS     4/19/2021 11:16 am     COMPARISON:   04/24/2015. HISTORY:   ORDERING SYSTEM PROVIDED HISTORY: fall, down on ground   TECHNOLOGIST PROVIDED HISTORY:   Reason for exam:->fall, down on ground   Reason for Exam: pain   Acuity: Acute   Type of Exam: Initial   Mechanism of Injury: fell to ground     FINDINGS:   Evaluation is partially limited due to inability to optimally position the   patient. Evaluation the sacrum is limited due to overlying bowel gas. The   sacroiliac joints appear grossly symmetric. The pubic symphysis appears   congruent. No convincing radiographic evidence of displaced hip fracture or   dislocation is seen. There is gaseous distention of the rectum. Degenerative arthritic changes affect the visualized lower lumbar spine. The   bones appear demineralized.                 CT CERVICAL SPINE WO CONTRAST (Final result)  Result time 04/19/21 12:06:54   Final result by therefore a small volume subarachnoid hemorrhage along the medial aspect of   the inferior left frontal lobe could also have this appearance. Consider CT   angiography of the brain to evaluate for a possible cerebral aneurysm. A   follow-up CT of the brain in 12-24 hours is recommended to ensure   stability/resolution. The above findings and recommendations were discussed with Orlin Hall   at noon on 04/19/2021. Narrative:    EXAMINATION:   CT OF THE HEAD WITHOUT CONTRAST 4/19/2021 11:27 am     TECHNIQUE:   CT of the head was performed without the administration of intravenous   contrast. Dose modulation, iterative reconstruction, and/or weight based   adjustment of the mA/kV was utilized to reduce the radiation dose to as low   as reasonably achievable. COMPARISON:   CT scan 05/27/2018 and MRI brain 08/03/2012     HISTORY:   ORDERING SYSTEM PROVIDED HISTORY: fall, down on ground for 2 days   TECHNOLOGIST PROVIDED HISTORY:   Has a \"code stroke\" or \"stroke alert\" been called? ->No   Reason for exam:->fall, down on ground for 2 days   Decision Support Exception->Emergency Medical Condition (MA)   Reason for Exam: fall, down on ground for 2 days   Acuity: Acute   Type of Exam: Initial     FINDINGS:   BRAIN/VENTRICLES: There is serpiginous high density along the medial aspect   of the left frontal lobe best seen on axial images 22, 23 and 24 and coronal   images 30, 31 and 32. This could represent a small vessel but is concerning   for a small amount of subarachnoid hemorrhage. This appears new compared   with the prior CT scan. There is no acute infarct. There is confluent   hypoattenuation within the periventricular white matter, unchanged. There is   no ventriculomegaly. Mild diffuse cerebral volume loss is unchanged. ORBITS: Limited evaluation of the orbits is unremarkable. SINUSES: The paranasal sinuses and mastoid air cells are clear.      SOFT TISSUES/SKULL: There is no skull fracture identified. EKG Interpretation  Please see ED physician's note - Dr. Karn Dakin - for EKG interpretation        Chart review shows recent radiographs:  No results found. ED COURSE & MEDICAL DECISION MAKING       Vital signs and nursing notes reviewed during ED course. I have independently evaluated this patient . Supervising physician - Dr. Yojana Lund - was present in ED and available for consultation throughout entirety of patient's care. All pertinent Lab data and radiographic results reviewed with patient at bedside. The patient and/or the family were informed of the results of any tests/labs/imaging, the treatment plan, and time was allotted to answer questions. Clinical Impression:  1. Traumatic rhabdomyolysis, initial encounter ([facility] [place] [address]    2. Elevated troponin    3. AVM (arteriovenous malformation)    4. Aneurysm ([facility] [place] [address]    5. Generalized weakness        Patient presents via EMS after being found down laying on the floor by son for 3 days of generalized weakness. On exam, pleasant 49-year-old female, alert and oriented x3, following all commands. Noted to be tachycardic with tacky mucous membranes were otherwise within normal limits. Nonlabored breathing. No evidence of traumatic bony injury to the chest wall, abdomen or extremities. Do not visualize any dependent ecchymotic bruising or edema to the skin. Patient does appear globally weak especially in the lower extremities without pelvis tenderness or instability. No midline C-spine tenderness. Lungs with diminished air exchange in abdomen otherwise soft nontender. Patient is placed on to telemetry continuous pulse ox monitoring. Started on IV fluids. CBC with leukocytosis 14.2 with left shift. Hemoglobin is 9.0 and CBC does appear hemoconcentrated. CMP without significant electrolyte disturbance, normal creatinine but BUN is 33. CK is 1108 with a troponin of 3.50.   Chest x-ray shows mild vascular congestion bilaterally small left pleural effusion. X-ray pelvis is unremarkable. EKG does show Q waves in the inferior anterior leads, no acute ST elevation. CT cervical spine shows no evidence of traumatic malalignment or acute fracture but there is an indeterminate 3.1 cm nodule within the right lobe of the thyroid gland recommending for nonemergent follow-up thyroid ultrasound. CT head does reveal serpiginous high density along the medial aspect of left frontal lobe which could represent normal vessel but was not that on previous CT and cannot completely rule out a small volume subarachnoid hemorrhage. Recommending for repeat CT head noncontrast as well as CT angio for possible cerebral aneurysm. Given head CT findings, I did consult with Dr. Ly Boucher, neurosurgery, who is agreeable with plan for CTA imaging and will plan to call back with results. Did add on BNP which is 32,193. Given unclear exact cause for her fall versus possible syncope as well as patient's troponin and abnormal EKG, did add on CTA chest.  Patient was evaluated in the emergency department by cardiology Dr. Daisy Riojas, stat echo was performed. There was concern that patient may have sustained an acute cardiac event as she did show LVEF of 10% with severe wall motion abnormalities. He is recommending heparin IV for 48 hours followed by full dose aspirin and Plavix but will need to await neurosurgery approval for CTA head results. CTA chest shows no evidence of PE but there is nonspecific small volume bilateral pleural effusions with atelectasis, left greater than right. Suspected distal esophagitis. 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 No other large vessel occlusion in the head or neck. I then called back Dr. Ly Boucher, discussed CTA head results.   At this point, he does recommend patient be transferred to [facility] as she may need endovascular management for further evaluation of aneurysm and AVM. This was discussed with patient verbalized understanding agreement. I did consult with Dr. Karuna Jane neurosurgery - and discussed patient's history, ED presentation/course including any pertinent laboratory findings and imaging study findings. He/she does agreed to accept patient for ED to ED transfer via  [facility]. While in the ED, patient did well, continues to be awake and alert, protecting her airway and does not develop any focal neuro deficits. Rapid Covid is negative. I did discuss this patient's history, ED presentation/course with my attending physician - Dr. Karn Dakin - who has also seen and evaluated this patient. He/she does agree that transfer is reasonable at this time. Please see his/her note for additional details of their evaluation. Disposition referral (if applicable):  No follow-up provider specified.     Disposition medications (if applicable):  Discharge Medication List as of 4/19/2021  6:03 PM            (Please note that portions of this note may have been completed with a voice recognition program. Efforts were made to edit the dictations but occasionally words are mis-transcribed.)          Alyssa Engle PA-C  04/19/21 [address] [facility], [facility]  04/19/21 0441

## 2021-04-19 NOTE — FLOWSHEET NOTE
This note also relates to the following rows which could not be included:  Pulse - Cannot attach notes to unvalidated device data  Resp - Cannot attach notes to unvalidated device data  BP - Cannot attach notes to unvalidated device data  MAP (mmHg) - Cannot attach notes to unvalidated device data  SpO2 - Cannot attach notes to unvalidated device data

## 2021-04-19 NOTE — PROGRESS NOTES
Patient had an echocardiogram performed stat. Shows LVEF 10% with severe wall motion abnormalities  Severe dilated cardiomyopathy  EKG reviewed shows Q waves in inferior anterior leads. It appears that the patient likely had an acute cardiac event, probably leading to fall -however her history contradicts, with absence of any chest pain shortness of breath. Discussed with ER physician Dr. Paulie Chance IV heparin 48 hours  Full dose aspirin 325 mg  Full dose Plavix 600 mg followed by 75 mg daily  Unless no contraindication and cleared by neurosurgery (patient has suspected subarachnoid hemorrhage ? Aneurysm)       Case was also discussed with Dr. Rock Mc, interventional cardiologist on-call.

## 2021-04-19 NOTE — ED NOTES
Pt incontinent of urine at this time. Complete linen change performed in addition to umesh care. Pt states she is normally continent but could not hold it long enough for someone to help her. Pt provided with warm blankets at this time. Pt belongings placed in bag. Including x2 pairs of socks, pajamas, a robe, and soiled underwear placed in its own bag.      Flora Solo RN  04/19/21 2313

## 2021-04-19 NOTE — ED NOTES
Report called to Dustin Atkins in [facility] ER at this time.      Spike Llanes RN  04/19/21 2757

## 2021-04-19 NOTE — ED TRIAGE NOTES
The patient presents to the emergency department by EMS alert and oriented with a complaint of a fall three days ago and has been on the floor since then and was found by her son. The patient denies any pain or injury. There is no skin break down noted. The patient placed on the monitor with vital signs taken. Assessment as follows; Skin is pink, warm and dry. Lung sounds are clear.

## 2021-04-19 NOTE — CONSULTS
INPATIENT CARDIOLOGY CONSULT NOTE       Reason for consultation:   Elevated troponin     Primary care physician: ROLANDO Robin      Dear No admitting provider for patient encounter. Thank you for the consult    Chief Complaint   Patient presents with    Fall     On the floor for three days       History of present illness:Lilia is a 77 y. o.year old who  presents with  Chief Complaint   Patient presents with    Fall     On the floor for three days     Patient is 38-year-old -American female with prior medical history significant for hypertension presents to the hospital with lethargy. Patient is noted to have elevated cardiac troponin for which cardiology is consulted. Patient was seen in the ER, with son at bedside. Patient son lives in [place] [facility] [facility] [place] who checks on her mom regularly. Patient lives by herself independently. She was last seen Saturday evening when she had [employer] sandwich. Patient mentions that she was alone at home when she felt weak and fell on the ground. She was unable to get up from the ground because of weakness. Patient son tried to call however she did not respond to the phone. Eventually came back to town and checked on her and found her on the ground. Patient denies passing out. She mentions she was too weak to get up. She denies any chest pain shortness of breath or edema. Patient admits to smoking cigarettes 5 to 10 cigarettes/day. Denies any alcohol or drug abuse    No prior history of CAD CHF or arrhythmias. EKG shows sinus rhythm with right bundle branch block and reciprocal changes      Past medical history:    has a past medical history of Arthritis, Cerebral artery occlusion with cerebral infarction [facility]), Hip discomfort, and Stuttering. Past surgical history:   has a past surgical history that includes Colonoscopy (2005); Colonoscopy (06/05/2019); and Colonoscopy (N/A, 6/5/2019).   Social History:   reports that she has been smoking cigarettes. She has been smoking about 0.25 packs per day. She has never used smokeless tobacco. She reports that she does not drink alcohol or use drugs. Family history:   no family history of CAD, STROKE of DM    No Known Allergies    0.9 % sodium chloride bolus, Once      Current Facility-Administered Medications   Medication Dose Route Frequency Provider Last Rate Last Admin    0.9 % sodium chloride bolus  1,000 mL Intravenous Once Yannick Lafleur PA-C         Current Outpatient Medications   Medication Sig Dispense Refill    Cholecalciferol (VITAMIN D3) 5000 units TABS Take by mouth every morning      vitamin C (ASCORBIC ACID) 500 MG tablet Take 500 mg by mouth every morning      clopidogrel (PLAVIX) 75 MG tablet Take 1 tablet by mouth daily. 30 tablet 0    hydrochlorothiazide (HYDRODIURIL) 12.5 MG tablet Take 1 tablet by mouth daily. 30 tablet 0         Review of Systems:     · Constitutional: No Fever or Weight Loss   · Eyes: No Decreased Vision  · ENT: No Headaches, Hearing Loss or Vertigo  · Cardiovascular: No chest pain, dyspnea on exertion, palpitations or loss of consciousness  · Respiratory: No cough or wheezing    · Gastrointestinal: No abdominal pain, appetite loss, blood in stools, constipation, diarrhea or heartburn  · Genitourinary: No dysuria, trouble voiding, or hematuria  · Musculoskeletal:  No gait disturbance, weakness or joint complaints  · Integumentary: No rash or pruritis  · Neurological: No TIA or stroke symptoms  · Psychiatric: No anxiety or depression  · Endocrine: No malaise, fatigue or temperature intolerance  · Hematologic/Lymphatic: No bleeding problems, blood clots or swollen lymph nodes  · Allergic/Immunologic: No nasal congestion or hives    All other systems were reviewed and were negative otherwise.          Physical Examination:      Vitals:    04/19/21 1330   BP:    Pulse: 103   Resp: 21   Temp:    SpO2:       Wt Readings from Last 3 Encounters:   04/19/21 140 lb (63.5 kg)   06/05/19 136 lb (61.7 kg)   05/27/18 145 lb (65.8 kg)     Body mass index is 20.67 kg/m². General Appearance:  No distress, conversant  Constitutional: Poorly developed poorly nourished  HEENT:  Normocephalic, Atraumatic, Oropharynx moist, No oral exudates,   Nose normal. Neck Supple Carotid: no carotid bruit  Eyes:  Conjunctiva normal, No discharge. Respiratory:    Normal breath sounds, No respiratory distress, No wheezing, no use of accessory muscles, diaphragm movement is normal  No chest Tenderness  Cardiovascular: S1-S2 No murmurs auscultated. No rubs, thrills or gallops. Normal  rhythm. Pedal pulses are normal. No pedal edema  GI:  Soft Non tender, non distended. :  No CVA tenderness. Musculoskeletal:   No tenderness, No cyanosis, No clubbing. Integument:  Warm, Dry, No erythema, No rash. Lymphatic:  No lymphadenopathy noted. Neurologic:  Alert & oriented x 3  No focal deficits noted. Psychiatric:  Affect normal, Judgment normal, Mood normal.       Lab Review     Recent Labs     04/19/21  1225   WBC 14.2*   HGB 19.0*   HCT 58.1*         Recent Labs     04/19/21  1225      K 4.2   CL 98*   CO2 28   BUN 33*   CREATININE 1.1     Recent Labs     04/19/21  1225   AST 81*   ALT 37   BILITOT 0.9   ALKPHOS 99     No results for input(s): TROPONINI in the last 72 hours. Lab Results   Component Value Date    BNP 22 08/03/2012     Lab Results   Component Value Date    INR 0.97 05/27/2018    PROTIME 11.2 05/27/2018         All labs, images, EKGs were personally reviewed      Assessment: 77 y. o.year old with PMH of  has a past medical history of Arthritis, Cerebral artery occlusion with cerebral infarction [facility]), Hip discomfort, and Stuttering. Recommendations:      1. Elevated troponin 3.57: Likely secondary to rhabdomyolysis in absence of chest pain shortness of breath or ischemic EKG changes. Will obtain stat echocardiogram to rule out wall motion abnormality.   Heparin cannot be initiated due to suspicious subarachnoid hemorrhage on CT scan  2. Rhabdomyolysis with total CK 1108, hemoconcentration with hemoglobin of 19 and hematocrit 58.1, with mild renal insufficiency. Recommend IV fluids and monitor. 3. Generalized weakness and fall: Patient is cachectic, poorly kept and malnourished. Recommend dietary consultation. Evaluate patient for occult malignancy per primary team.  4. ?  Subarachnoid hemorrhage on CT scan : Clinically patient does not exhibit any signs of headache blurry vision. Recommend further work-up per ER/primary team  5. Smoking history: Patient was counseled again smoking.     Thank you for the consult    Dr. Dieudonne Benitez  4/19/2021 3:42 PM

## 2021-04-19 NOTE — ED NOTES
Bed: ED-32  Expected date:   Expected time:   Means of arrival:   Comments:  EMS, 60 F weakness     Denise Mora RN  04/19/21 6606

## 2021-04-20 LAB
EKG ATRIAL RATE: 111 BPM
EKG DIAGNOSIS: NORMAL
EKG P AXIS: 78 DEGREES
EKG P-R INTERVAL: 144 MS
EKG Q-T INTERVAL: 352 MS
EKG QRS DURATION: 134 MS
EKG QTC CALCULATION (BAZETT): 478 MS
EKG R AXIS: 99 DEGREES
EKG T AXIS: -40 DEGREES
EKG VENTRICULAR RATE: 111 BPM

## 2021-04-20 PROCEDURE — 93010 ELECTROCARDIOGRAM REPORT: CPT | Performed by: INTERNAL MEDICINE

## 2021-04-26 NOTE — ED PROVIDER NOTES
I independently examined and evaluated [facility]. HPI:  In brief, patient is a 77 y.o. female that presents to the emergency department for evaluation of generalized weakness after a fall. Patient reportedly fell on Saturday, 4/17, and had been on the ground since. On Friday, 4/16, patient developed food poisoning with nausea, vomiting, diarrhea. She had multiple episodes of nonbloody nonbilious emesis. On Saturday, she fell, is unsure if it was related. Does admit to blunt head trauma with loss of consciousness. Patient had difficulty getting to a standing position. Was laying on her stomach. Son found her this morning, called EMS for transport to the hospital.  Patient does admit to feeling generalized weakness. No localized pain or neurologic changes. Patient does admit to Plavix use. No double vision, blurry vision, change in vision. No flashes or floaters. No tinnitus, buzzing, ringing in the ears. No facial droop, slurred speech, aphasia, dysphagia. Denies numbness, tingling, weakness, paresthesias. No additional precipitating, modifying, alleviating features. Physical Exam:  Triage VS:    ED Triage Vitals   Enc Vitals Group      BP 04/19/21 1048 (!) 115/93      Pulse 04/19/21 1048 113      Resp 04/19/21 1048 20      Temp 04/19/21 1048 96 °F (35.6 °C)      Temp Source 04/19/21 1046 Oral      SpO2 04/19/21 1230 90 %      Weight 04/19/21 1048 140 lb (63.5 kg)      Height 04/19/21 1048 5' 9\" (1.753 m)     My pulse ox interpretation is - WNL    General appearance:  Patient is awake, alert, oriented. Following commands and answering questions. GCS is 15. Non-toxic in appearance. Skin:  Warm. Dry. Intact. No rashes, petechiae, purpura. Eye:  Pupils are equal, round, reactive. Extraocular movements intact. No nystagmus. No gaze deviation. Head, ears, nose, mouth and throat:  Head is normocephalic, atraumatic. No external masses or lesions. No nasal drainage.  Pharynx is clear, non-erythematous. Airway is patent. Mucous membranes are moist  Neck:  Supple. No nuchal rigidity. Trachea midline. No masses, thyromegaly or lymphadenopathy. No JVD. No carotid thrills or bruits. Extremity:  No clubbing, cyanosis, or edema. No joint swelling. Normal muscle tone. Full range of motion in extremities. Negative logroll bilateral lower extremities. No pain with axial compression. Heart:  Tachycardic rate and regular rhythm. Audible S1 & S2. No audible murmurs, rubs, or gallops. Perfusion:  Symmetric peripheral pulses. Brisk capillary refill. Compartments are soft and compressible. Respiratory:  Lungs clear to auscultation bilaterally. No rales, rhonchi or wheezes. Respirations nonlabored. Abdominal:  Soft. Nontender. Non distended. Normal bowel sounds. No midline pulsatile abdominal masses, thrills or bruits. Back:  No CVA tenderness to palpation. No midline tenderness to palpation in the C-spine, T-spine, L-spine. Neurological:  Alert & oriented x3 , No focal deficits noted. Cranial nerves II through XII grossly intact. No slurred speech. No facial droop. Finger to nose intact, difficult to perform heel-to-shin testing secondary to lower leg weakness. Otherwise upper extremity with normal gross motor coordination and strength. Sensation equal intact light sharp touch about the bilateral upper and lower extremities. No pronator drift. No truncal ataxia in the bed but patient does appear globally weak and deconditioned. Psychiatric:  Cooperative. EKG: Interpreted by myself, independent of cardiology. EKG performed on 4/19/2021 at 1357 demonstrates ventricular rate of 111 bpm sinus tachycardia. Left atrial enlargement. Right bundle branch block pattern. Nonspecific ST and T wave changes in inferior and anterior leads. MDM:  Patient was seen and evaluated in the emergency department by myself and Paschal Sandhoff PA-C.   A thorough history and physical exam were performed, prior medical records reviewed. Upon arrival patient's vital signs were noted and were stable. ATLS protocol was followed. Airway was assessed and was able to protect airway without difficulty in breathing. Breath sounds were auscultated bilaterally with symmetric chest rise. Circulation was intact with strong pulses in the upper and lower extremities. Compartments are soft and compressible. No signs of compartment syndrome. Capillary refill is brisk and less than 2 seconds. Disability status was assessed. Patient is awake, alert, oriented. Following commands and answering questions. GCS is 15. Patient was fully exposed and rolled to the side. No tenderness to palpation at the midline of the C-spine, T-spine, L-spine. No step-offs or deformities. No clinical signs of cauda equina syndrome. Patient was connected to continuous cardiopulmonary monitoring. Rhythm strip was interpreted by myself demonstrating sinus rhyth. Differential diagnoses and treatment plan were discussed. IV access is established. Pertinent labs are drawn and radiographic studies were performed. Once results are available, they were reviewed by myself. CT brain radiology report reads serpiginous high density along the medial aspect of the left frontal lobe which could represent a normal vessel but was not evident on a prior CT scan and therefore small volume subarachnoid hemorrhage along the medial aspect of the inferior left frontal lobe could also have this appearance. Consider CT angiography of the brain to evaluate for possible cerebral aneurysm. CT cervical spine without contrast read as report reads no acute fracture of the cervical spine evident. Indeterminate 3.1 cm nodule within the right lobe of the thyroid gland for which nonemergent follow-up thyroid ultrasound is recommended. Chest x-ray radiology report reads mild vascular congestion bilaterally and small left pleural effusion.   Pelvic x-ray radiology report reads no convincing radiographic evidence of acute osseous abnormality pelvis is seen. Labs demonstrate leukocytosis white blood cell count 14.2. No anemia or thrombocytopenia. Electrolytes are within normal limits apart from chloride which is hypochloremic at 98. AST slightly elevated at 81. ALT within normal limits. CPK is 1108. Magnesium 2.6. On repeat secondary survey, patient remained stable without any additional injuries identified. Troponin 3.57. proBNP 32,193. Given unclear exact cause for fall, CT angiography chest was added. Given elevated troponin, case is discussed with cardiology on-call Dr. Bobbi Salazar and stat echocardiogram was performed. There was concern the patient may have sustained an acute cardiac event as left ventricular ejection fraction was 10% with severe wall motion abnormalities. Cardiology is recommending heparin intravenous for 48 hours followed by full dose aspirin and Plavix, will require neurosurgery approval given findings on CT brain. No recommendation for cardiac catheterization at this time. Case is immediately discussed with neurosurgeon on-call Dr. Martell Zhu who is agreeable with CT angiography. CT angiography head and neck with IV contrast weighted report reads a left arteriovenous malformation accounting for the hyperdensity seen on recent CT. No definite evidence of subarachnoid hemorrhage. 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 Motion degraded CTA without large vessel occlusion in the head and neck. CT angiography chest with contrast read as reports no pulmonary embolism. Nonspecific small volume bilateral pleural effusion with minimal associated relaxation atelectasis greater on the left than the right. Coronary artery disease. Case again discussed with neurosurgery on-call Dr. Martell Zhu. He recommends transfer to [facility] for endovascular neurosurgery intervention, further evaluation of aneurysm and AVM.   Recommends holding anticoagulation until evaluated by neurosurgery. Case is discussed with Dr. Yunior Mckeon at [facility] who is agreeable with treatment plan and accepts transfer to the neuro ICU. Results of laboratory and radiographic data along with differential diagnosis and plan are discussed with the patient. Presents after a fall, symptoms are concerning for AV malformation in the brain. Does have a 4 mm intranidal brain aneurysm as well. Patient does also have acute non-ST segment elevation myocardial infarction with EKG changes. No active chest pain. Does have traumatic rhabdomyolysis, no signs of acute kidney injury. Incidentally, does have a 3.1 cm right thyroid nodule which patient is made aware of. May require biopsy, additional imaging for further evaluation. Given her clinical presentation and diagnostic results in the emergency department, we recommend admission and patient is agreeable to transfer to [facility]. Transfer paperwork and EMTALA paperwork are completed. Patient is updated at bedside. Patient is transported via [address] [facility] in hemodynamically stable condition. Clinical Impressions:  1. Fall from standing, initial encounter    2. Blunt head trauma, initial encounter    3. AVM (arteriovenous malformation) brain    4. 4mm intra-nidal brain aneurysm    5. Traumatic rhabdomyolysis, initial encounter ([address]    6. NSTEMI (non-ST elevated myocardial infarction) ([address]    7. Acute systolic congestive heart failure ([address]    8. Depressed left ventricular ejection fraction    9. 3.1 cm right thyroid nodule    10. Bilateral pleural effusion    11. Transaminitis    12. Plavix induced coagulopathy        Critical Care Note:  Total critical care time provided today was 32 minutes. This excludes seperately billable procedures and family discussion time.  Critical care time provided for obtaining history, conducting a physical exam, performing and monitoring interventions, ordering, collecting and interpreting tests, and establishing medical decision-making. There was a potential for life/limb threatening pathology requiring close evaluation and intervention with concern for patient decompensation. Disposition:  DISPOSITION Decision To Transfer 04/19/2021 05:01:32 PM          All diagnostic, treatment, and disposition decisions were made by myself in conjunction with the advanced practice provider. For all further details of the patient's emergency department visit, please see the advanced practice provider's documentation. Comment: Please note this report has been produced using speech recognition software and may contain errors related to that system including errors in grammar, punctuation, and spelling, as well as words and phrases that may be inappropriate. If there are any questions or concerns please feel free to contact the dictating provider for clarification.      [address] [facility],   04/26/21 5889

## (undated) DEVICE — LINER SUCT CANSTR 1500CC SEMI RIG W/ POR HYDROPHOBIC SHUT

## (undated) DEVICE — JELLY LUBRICATING 3 GM BACTERIOSTATIC

## (undated) DEVICE — ACUSNARE POLYPECTOMY SNARE: Brand: ACUSNARE

## (undated) DEVICE — LINE SAMP O2 6.5FT W/FEMALE CONN F/ADULT CAPNOLINE PLUS

## (undated) DEVICE — KENDALL 500 SERIES DIAPHORETIC FOAM MONITORING ELECTRODE - TEAR DROP SHAPE ( 30/PK): Brand: KENDALL

## (undated) DEVICE — BW-412T DISP COMBO CLEANING BRUSH: Brand: SINGLE USE COMBINATION CLEANING BRUSH

## (undated) DEVICE — TUBING, SUCTION, 3/16" X 6', STRAIGHT: Brand: MEDLINE